# Patient Record
Sex: MALE | Race: OTHER | Employment: UNEMPLOYED | URBAN - METROPOLITAN AREA
[De-identification: names, ages, dates, MRNs, and addresses within clinical notes are randomized per-mention and may not be internally consistent; named-entity substitution may affect disease eponyms.]

---

## 2021-09-10 PROBLEM — E66.01 MORBID OBESITY DUE TO EXCESS CALORIES (HCC): Status: ACTIVE | Noted: 2021-09-10

## 2022-11-19 ENCOUNTER — HOSPITAL ENCOUNTER (EMERGENCY)
Age: 52
Discharge: HOME | End: 2022-11-19
Attending: EMERGENCY MEDICINE
Payer: MEDICARE

## 2022-11-19 VITALS
TEMPERATURE: 97.6 F | WEIGHT: 315 LBS | BODY MASS INDEX: 55.09 KG/M2 | RESPIRATION RATE: 18 BRPM | OXYGEN SATURATION: 95 % | HEART RATE: 83 BPM | SYSTOLIC BLOOD PRESSURE: 149 MMHG | DIASTOLIC BLOOD PRESSURE: 101 MMHG

## 2022-11-19 DIAGNOSIS — H69.82 DYSFUNCTION OF LEFT EUSTACHIAN TUBE: Primary | ICD-10-CM

## 2022-11-19 PROCEDURE — 99283 EMERGENCY DEPT VISIT LOW MDM: CPT

## 2022-11-19 PROCEDURE — 6360000002 HC RX W HCPCS: Performed by: EMERGENCY MEDICINE

## 2022-11-19 PROCEDURE — 6370000000 HC RX 637 (ALT 250 FOR IP): Performed by: EMERGENCY MEDICINE

## 2022-11-19 RX ORDER — FLUTICASONE PROPIONATE 50 MCG
1 SPRAY, SUSPENSION (ML) NASAL DAILY
Qty: 1 EACH | Refills: 0 | Status: SHIPPED | OUTPATIENT
Start: 2022-11-19 | End: 2022-11-26

## 2022-11-19 RX ADMIN — DEXAMETHASONE 10 MG: 6 TABLET ORAL at 04:40

## 2022-11-19 ASSESSMENT — ENCOUNTER SYMPTOMS
COUGH: 0
NAUSEA: 0
VOMITING: 0

## 2022-11-19 NOTE — ED PROVIDER NOTES
[address] [facility]  Emergency Medicine Department    Pt Name: Benji Smith  MRN: [id number]  [place] 1970  Date of evaluation: 11/19/2022  Provider: Belinda Veloz MD    [address] [place] [address]     Chief Complaint   Patient presents with    Abdominal Pain    Otalgia     Left ear  pain since today after took shower       HISTORY OF PRESENT ILLNESS  (Location/Symptom, Timing/Onset, Context/Setting,Quality, Duration, Modifying Factors, Severity.)   Benji Smith is a 46 y.o. male who presents to the emergency department complaining of discomfort in his left ear. He states that after he took a shower he noticed the ear felt full and muffled. He denies pain. Denies fevers. He tried using a Q-tip to get water out but it did not help. He denies any drainage from the ear. Denies any recent sickness. Nursing Notes were reviewed. ALLERGIES     Patient has no known allergies. CURRENT MEDICATIONS       Previous Medications    ATORVASTATIN (LIPITOR) 40 MG TABLET    Take 1 tablet by mouth nightly    BLOOD GLUCOSE TEST STRIPS (ASCENSIA AUTODISC VI;ONE TOUCH ULTRA TEST VI) STRIP    1 each by In Vitro route daily Indications: Type 2 Diabetes Dispense True Metrix blood glucose test strips for testing one time daily    COMPRESSION STOCKINGS MISC    by Does not apply route    COMPRESSION STOCKINGS MISC    by Does not apply route    FUROSEMIDE (LASIX) 20 MG TABLET    Take 1 tablet by mouth once daily    GLUCOSE MONITORING (FREESTYLE FREEDOM) KIT    1 kit by Does not apply route daily Indications: Type 2 Diabetes Dispense True Metrix glucometer for testing one time daily    LANCETS MISC.  MISC    1 each by Does not apply route daily Indications: Type 2 Diabetes Dispense True Metrix lancets for testing one time daily    METFORMIN (GLUCOPHAGE) 500 MG TABLET    Take 1 tablet by mouth daily (with breakfast)    SILDENAFIL (VIAGRA) 50 MG TABLET    Take 1 tablet by mouth as needed for Erectile Dysfunction       PAST MEDICAL HISTORY         Diagnosis Date    Back pain     Diabetes mellitus type 2 in obese [facility])     Joint pain, knee     Morbid obesity due to excess calories ([facility] [address] 09/10/2021    Muscle weakness     Other hyperlipidemia     Snoring     Snoring     SOBOE (shortness of breath on exertion)     SOBOE (shortness of breath on exertion)        SURGICAL HISTORY     History reviewed. No pertinent surgical history. FAMILY HISTORY           Problem Relation Age of Onset    Heart Disease Mother     High Blood Pressure Mother     Diabetes Mother     Breast Cancer Mother     High Blood Pressure Sister     Diabetes Sister     Heart Disease Brother     High Blood Pressure Brother     Kidney Disease Brother     Other Brother         liver issues     Family Status   Relation Name Status    Mother      Father  Alive    Sister      Sister  Alive    Sister  Alive    Sister  Alive    Brother  Alive    MGM      MGF      PGM      PGF          SOCIAL HISTORY      reports that he has never smoked. He has never used smokeless tobacco. He reports that he does not drink alcohol and does not use drugs. REVIEW OF SYSTEMS    (2-9 systems for level 4, 10 or more for level 5)     Review of Systems   Constitutional:  Negative for chills and fever. HENT:  Negative for congestion, ear discharge and ear pain. Muffled left ear   Respiratory:  Negative for cough. Gastrointestinal:  Negative for nausea and vomiting. Allergic/Immunologic: Negative for immunocompromised state. Neurological:  Negative for dizziness and headaches. PHYSICAL EXAM    (up to 7 for level 4, 8 or more for level 5)     ED Triage Vitals [22 0414]   BP Temp Temp Source Heart Rate Resp SpO2 Height Weight   (!) 149/101 97.6 °F (36.4 °C) Oral 83 18 95 % -- (!) 395 lb (179.2 kg)       Physical Exam  Vitals and nursing note reviewed.    Constitutional:       General: He is not in acute distress. Appearance: Normal appearance. He is obese. He is not ill-appearing. HENT:      Right Ear: Tympanic membrane normal.      Left Ear: Ear canal and external ear normal. No tenderness. Tympanic membrane is scarred and bulging. Nose: Nose normal.      Mouth/Throat:      Mouth: Mucous membranes are moist.   Eyes:      Extraocular Movements: Extraocular movements intact. Cardiovascular:      Pulses: Normal pulses. Pulmonary:      Effort: Pulmonary effort is normal. No respiratory distress. Musculoskeletal:         General: No deformity or signs of injury. Normal range of motion. Cervical back: Normal range of motion and neck supple. Skin:     General: Skin is warm and dry. Neurological:      General: No focal deficit present. Mental Status: He is alert and oriented to person, place, and time. Psychiatric:         Mood and Affect: Mood normal.         Behavior: Behavior normal.       DIAGNOSTIC RESULTS     RADIOLOGY:   Non-plain film images such as CT, Ultrasound and MRI are read by theradiologist. Plain radiographic images are visualized and preliminarily interpreted by the emergency physician with the below findings:    None indicated    ED BEDSIDE ULTRASOUND:   Performed by ED Physician - none    LABS:  Labs Reviewed - No data to display    All other labs were within normal range or not returned as of this dictation. EMERGENCYDEPARTMENT COURSE and DIFFERENTIAL DIAGNOSIS/MDM:   Vitals:    Vitals:    11/19/22 0414   BP: (!) 149/101   Pulse: 83   Resp: 18   Temp: 97.6 °F (36.4 °C)   TempSrc: Oral   SpO2: 95%   Weight: (!) 395 lb (179.2 kg)     51-year-old male with a sensation of fullness in his left ear. The tympanic membrane appears scarred and slightly bulging but no erythema. He has no pain either. I do not feel this is consistent with otitis media. Likely eustachian tube dysfunction.   We will treat with a dose of oral steroid and place the patient on nasal steroid spray.    CONSULTS:  None    PROCEDURES:  None indicated    FINAL IMPRESSION     1.  Dysfunction of left eustachian tube          DISPOSITION/PLAN   DISPOSITION Decision To Discharge 11/19/2022 04:30:20 AM    PATIENT REFERRED TO:   Surinder Andersen MD  [address] [place] [address] [phone]    Schedule an appointment as soon as possible for a visit   For Follow up, As needed    DISCHARGE MEDICATIONS:     New Prescriptions    FLUTICASONE (FLONASE) 50 MCG/ACT NASAL SPRAY    1 spray by Each Nostril route daily for 7 days     (Please note that portions of this note were completed with a voice recognition program.  Efforts were made to edit the dictations but occasionally words are mis-transcribed.)    Kim Martines MD  Attending Emergency Physician          Kim Martines MD  11/19/22 8549

## 2022-11-19 NOTE — ED TRIAGE NOTES
Mode of arrival (squad #, walk in, police, etc) : Walk in  Chief complaint(s): Left ear pain  Arrival Note (brief scenario, treatment PTA, etc). : came with complains of   left ear  discomfort  after  pt took shower today  4 hours ago. Feel  uncomfortable . Muffles in  left ear and unable to sleep . C= \"Have you ever felt that you should Cut down on your drinking? \"  No  A= \"Have people Annoyed you by criticizing your drinking? \"  No  G= \"Have you ever felt bad or Guilty about your drinking? \"  No  E= \"Have you ever had a drink as an Eye-opener first thing in the morning to steady your nerves or to help a hangover? \"  No    Deferred []    Reason for deferring: N/A  *If yes to two or more: probable alcohol abuse. *

## 2022-11-25 ENCOUNTER — HOSPITAL ENCOUNTER (EMERGENCY)
Age: 52
Discharge: HOME | End: 2022-11-25
Attending: EMERGENCY MEDICINE
Payer: MEDICARE

## 2022-11-25 VITALS
HEART RATE: 79 BPM | RESPIRATION RATE: 16 BRPM | SYSTOLIC BLOOD PRESSURE: 176 MMHG | DIASTOLIC BLOOD PRESSURE: 100 MMHG | TEMPERATURE: 98.1 F | OXYGEN SATURATION: 98 %

## 2022-11-25 DIAGNOSIS — H60.392 INFECTIVE OTITIS EXTERNA OF LEFT EAR: Primary | ICD-10-CM

## 2022-11-25 PROCEDURE — 6370000000 HC RX 637 (ALT 250 FOR IP): Performed by: EMERGENCY MEDICINE

## 2022-11-25 PROCEDURE — 6360000002 HC RX W HCPCS: Performed by: EMERGENCY MEDICINE

## 2022-11-25 PROCEDURE — 99284 EMERGENCY DEPT VISIT MOD MDM: CPT

## 2022-11-25 PROCEDURE — 96372 THER/PROPH/DIAG INJ SC/IM: CPT

## 2022-11-25 RX ORDER — NAPROXEN 500 MG/1
500 TABLET ORAL 2 TIMES DAILY
Qty: 20 TABLET | Refills: 0 | Status: SHIPPED | OUTPATIENT
Start: 2022-11-25

## 2022-11-25 RX ORDER — KETOROLAC TROMETHAMINE 30 MG/ML
30 INJECTION, SOLUTION INTRAMUSCULAR; INTRAVENOUS ONCE
Status: COMPLETED | OUTPATIENT
Start: 2022-11-25 | End: 2022-11-25

## 2022-11-25 RX ORDER — NEOMYCIN SULFATE, POLYMYXIN B SULFATE AND HYDROCORTISONE 10; 3.5; 1 MG/ML; MG/ML; [USP'U]/ML
3 SUSPENSION/ DROPS AURICULAR (OTIC) ONCE
Status: COMPLETED | OUTPATIENT
Start: 2022-11-25 | End: 2022-11-25

## 2022-11-25 RX ORDER — ACETAMINOPHEN 500 MG
1000 TABLET ORAL EVERY 6 HOURS PRN
Qty: 60 TABLET | Refills: 0 | Status: SHIPPED | OUTPATIENT
Start: 2022-11-25 | End: 2022-11-25 | Stop reason: SDUPTHER

## 2022-11-25 RX ORDER — ACETAMINOPHEN 500 MG
1000 TABLET ORAL EVERY 6 HOURS PRN
Qty: 60 TABLET | Refills: 0 | Status: SHIPPED | OUTPATIENT
Start: 2022-11-25

## 2022-11-25 RX ORDER — NAPROXEN 500 MG/1
500 TABLET ORAL 2 TIMES DAILY
Qty: 20 TABLET | Refills: 0 | Status: SHIPPED | OUTPATIENT
Start: 2022-11-25 | End: 2022-11-25 | Stop reason: SDUPTHER

## 2022-11-25 RX ADMIN — KETOROLAC TROMETHAMINE 30 MG: 30 INJECTION, SOLUTION INTRAMUSCULAR at 21:22

## 2022-11-25 RX ADMIN — NEOMYCIN SULFATE, POLYMYXIN B SULFATE AND HYDROCORTISONE 3 DROP: 10; 3.5; 1 SUSPENSION/ DROPS AURICULAR (OTIC) at 21:25

## 2022-11-25 ASSESSMENT — PAIN DESCRIPTION - DESCRIPTORS: DESCRIPTORS: PATIENT UNABLE TO DESCRIBE

## 2022-11-25 ASSESSMENT — PAIN - FUNCTIONAL ASSESSMENT: PAIN_FUNCTIONAL_ASSESSMENT: 0-10

## 2022-11-25 ASSESSMENT — PAIN DESCRIPTION - LOCATION: LOCATION: EAR

## 2022-11-25 ASSESSMENT — PAIN SCALES - GENERAL
PAINLEVEL_OUTOF10: 4
PAINLEVEL_OUTOF10: 5

## 2022-11-25 ASSESSMENT — PAIN DESCRIPTION - ORIENTATION: ORIENTATION: LEFT

## 2022-11-26 NOTE — ED NOTES
Pt comes to this ER with c/o ongoing lt earache after getting water in his ear while taking a shower. Pt arrives A+O x 4, GCS = 15, PMS x 4 intact, eupneic, and PWD. Pt denies fever, chills, or sweats. Pt has no visible ear drainage.      Stefanie Hale RN  11/25/22 8040

## 2022-11-26 NOTE — ED TRIAGE NOTES
Mode of arrival (squad #, walk in, police, etc) : walk in        Chief complaint(s): left ear pain        Arrival Note (brief scenario, treatment PTA, etc). : states he got fluid behind his ear 1 week ago. Seen and treated but pain came back about 3 days ago        C= \"Have you ever felt that you should Cut down on your drinking? \"  No  A= \"Have people Annoyed you by criticizing your drinking? \"  No  G= \"Have you ever felt bad or Guilty about your drinking? \"  No  E= \"Have you ever had a drink as an Eye-opener first thing in the morning to steady your nerves or to help a hangover? \"  No      Deferred []      Reason for deferring: N/A    *If yes to two or more: probable alcohol abuse. *

## 2022-11-26 NOTE — ED PROVIDER NOTES
EMERGENCY DEPARTMENT ENCOUNTER    Pt Name: Gerry Matias  MRN: [id number]  [place] 1970  Date of evaluation: 11/25/22  CHIEF COMPLAINT       Chief Complaint   Patient presents with    Otalgia     HISTORY OF PRESENT ILLNESS     Ear Problem  Location:  Left  Severity:  Moderate  Onset quality:  Gradual  Duration:  1 week  Timing:  Constant  Progression:  Worsening  Chronicity:  New  Relieved by:  Nothing  Worsened by:  Nothing  Ineffective treatments:  None tried  Associated symptoms: no tinnitus      Increasing pain ever since getting water in left ear a week ago in the shower  Was seen here a week ago, took some steroids and nasal spray that didn't work    REVIEW OF SYSTEMS     Review of Systems   HENT:  Negative for tinnitus. All other systems reviewed and are negative. PASTMEDICAL HISTORY     Past Medical History:   Diagnosis Date    Back pain     Diabetes mellitus type 2 in obese [facility])     Joint pain, knee     Morbid obesity due to excess calories ([facility] [address] 09/10/2021    Muscle weakness     Other hyperlipidemia     Snoring     Snoring     SOBOE (shortness of breath on exertion)     SOBOE (shortness of breath on exertion)      Past Problem List  Patient Active Problem List   Diagnosis Code    Morbid obesity due to excess calories ([facility] [address] E66.01    Back pain M54.9    Diabetes mellitus type 2 in obese ([facility]) E11.69, E66.9    Other hyperlipidemia E78.49     SURGICAL HISTORY     History reviewed. No pertinent surgical history.   CURRENT MEDICATIONS       Current Discharge Medication List        CONTINUE these medications which have NOT CHANGED    Details   fluticasone (FLONASE) 50 MCG/ACT nasal spray 1 spray by Each Nostril route daily for 7 days  Qty: 1 each, Refills: 0      furosemide (LASIX) 20 MG tablet Take 1 tablet by mouth once daily  Qty: 90 tablet, Refills: 0      atorvastatin (LIPITOR) 40 MG tablet Take 1 tablet by mouth nightly  Qty: 30 tablet, Refills: 3    Associated Diagnoses: Type 2 diabetes mellitus without complication, without long-term current use of insulin ([facility])      metFORMIN (GLUCOPHAGE) 500 MG tablet Take 1 tablet by mouth daily (with breakfast)  Qty: 30 tablet, Refills: 3    Associated Diagnoses: Type 2 diabetes mellitus without complication, without long-term current use of insulin ([facility])      sildenafil (VIAGRA) 50 MG tablet Take 1 tablet by mouth as needed for Erectile Dysfunction  Qty: 30 tablet, Refills: 3    Associated Diagnoses: Erectile dysfunction, unspecified erectile dysfunction type      glucose monitoring (FREESTYLE FREEDOM) kit 1 kit by Does not apply route daily Indications: Type 2 Diabetes Dispense True Metrix glucometer for testing one time daily  Qty: 1 kit, Refills: 0    Associated Diagnoses: Type 2 diabetes mellitus without complication, without long-term current use of insulin ([facility])      blood glucose test strips (ASCENSIA AUTODISC VI;ONE TOUCH ULTRA TEST VI) strip 1 each by In Vitro route daily Indications: Type 2 Diabetes Dispense True Metrix blood glucose test strips for testing one time daily  Qty: 100 strip, Refills: 3      Lancets Misc. MISC 1 each by Does not apply route daily Indications: Type 2 Diabetes Dispense True Metrix lancets for testing one time daily  Qty: 100 each, Refills: 3      !! Compression Stockings MISC by Does not apply route  Qty: 1 each, Refills: 0      !! Compression Stockings MISC by Does not apply route  Qty: 1 each, Refills: 0       !! - Potential duplicate medications found. Please discuss with provider. ALLERGIES     has No Known Allergies. FAMILY HISTORY     He indicated that his mother is . He indicated that his father is alive. He indicated that three of his four sisters are alive. He indicated that his brother is alive. He indicated that his maternal grandmother is . He indicated that his maternal grandfather is . He indicated that his paternal grandmother is .  He indicated that his paternal grandfather is . SOCIAL HISTORY       Social History     Tobacco Use    Smoking status: Never    Smokeless tobacco: Never   Vaping Use    Vaping Use: Never used   Substance Use Topics    Alcohol use: Never    Drug use: Never     Comment: caffeine occ     PHYSICAL EXAM     INITIAL VITALS: BP (!) 176/100   Pulse 79   Temp 98.1 °F (36.7 °C)   Resp 16   SpO2 98%    Physical Exam  Constitutional:       General: He is not in acute distress. Appearance: Normal appearance. He is well-developed. He is not diaphoretic. HENT:      Head: Normocephalic and atraumatic. Right Ear: External ear normal.      Left Ear: External ear normal.      Ears:      Comments: Left EAC edema and mild erythema and tenderness, mild pain with auricular traction, no mastoid tenderness, unable to visualize tm left ear due to the EAC edema     Nose: Nose normal. No congestion. Mouth/Throat:      Mouth: Mucous membranes are moist.      Pharynx: Oropharynx is clear. Eyes:      General:         Right eye: No discharge. Left eye: No discharge. Conjunctiva/sclera: Conjunctivae normal.      Pupils: Pupils are equal, round, and reactive to light. Neck:      Trachea: No tracheal deviation. Cardiovascular:      Rate and Rhythm: Normal rate and regular rhythm. Pulses: Normal pulses. Heart sounds: Normal heart sounds. Pulmonary:      Effort: Pulmonary effort is normal. No respiratory distress. Breath sounds: Normal breath sounds. No stridor. No wheezing or rales. Abdominal:      Palpations: Abdomen is soft. Tenderness: There is no abdominal tenderness. There is no guarding or rebound. Musculoskeletal:         General: No tenderness or deformity. Normal range of motion. Cervical back: Normal range of motion and neck supple. Skin:     General: Skin is warm and dry. Capillary Refill: Capillary refill takes less than 2 seconds. Findings: No erythema or rash.    Neurological: General: No focal deficit present. Mental Status: He is alert and oriented to person, place, and time. Coordination: Coordination normal.   Psychiatric:         Mood and Affect: Mood normal.         Behavior: Behavior normal.         Thought Content: Thought content normal.         Judgment: Judgment normal.       MEDICAL DECISION MAKING:   Topical cortisporin  Toradol in ED  Tylenol and naprosyn rx also  Discussed with patient anticipatory guidance, discharge instructions, follow up PCP and ENT 24 hours      #93 - Acute Otitis Externa (AOE): Systemic Antimicrobial Therapy   [x]The patient has Acute Otitis Externa and was not prescribed oral/systemic antibiotics today.  [SATISFIES MIPS PERFORMANCE]         Procedures    DIAGNOSTIC RESULTS     EMERGENCY DEPARTMENTCOURSE:         Vitals:    Vitals:    11/25/22 2028   BP: (!) 176/100   Pulse: 79   Resp: 16   Temp: 98.1 °F (36.7 °C)   SpO2: 98%       The patient was given the following medications while in the emergency department:  Orders Placed This Encounter   Medications    ketorolac (TORADOL) injection 30 mg    neomycin-polymyxin-hydrocortisone (CORTISPORIN) otic suspension 3 drop    DISCONTD: neomycin-polymyxin-hydrocortisone (CORTISPORIN) 3.5-28258-6 otic solution     Sig: Place 3 drops into the left ear 4 times daily for 10 days     Dispense:  1 each     Refill:  0    DISCONTD: acetaminophen (TYLENOL) 500 MG tablet     Sig: Take 2 tablets by mouth every 6 hours as needed for Pain     Dispense:  60 tablet     Refill:  0    DISCONTD: naproxen (NAPROSYN) 500 MG tablet     Sig: Take 1 tablet by mouth 2 times daily     Dispense:  20 tablet     Refill:  0    neomycin-polymyxin-hydrocortisone (CORTISPORIN) 3.5-76429-6 otic solution     Sig: Place 3 drops into the left ear 4 times daily for 10 days     Dispense:  1 each     Refill:  0    naproxen (NAPROSYN) 500 MG tablet     Sig: Take 1 tablet by mouth 2 times daily     Dispense:  20 tablet     Refill:  0 acetaminophen (TYLENOL) 500 MG tablet     Sig: Take 2 tablets by mouth every 6 hours as needed for Pain     Dispense:  60 tablet     Refill:  0     CONSULTS:  None    FINAL IMPRESSION      1. Infective otitis externa of left ear          DISPOSITION/PLAN   DISPOSITION Decision To Discharge 11/25/2022 09:15:18 PM      PATIENT REFERRED TO:  Fiorella Ansari MD  [address] [place] [address] [phone]    Schedule an appointment as soon as possible for a visit in 1 day      [facility], [facility]  [phone]    Schedule an appointment as soon as possible for a visit in 1 day      DISCHARGE MEDICATIONS:  Current Discharge Medication List        START taking these medications    Details   neomycin-polymyxin-hydrocortisone (CORTISPORIN) 3.5-77977-0 otic solution Place 3 drops into the left ear 4 times daily for 10 days  Qty: 1 each, Refills: 0      naproxen (NAPROSYN) 500 MG tablet Take 1 tablet by mouth 2 times daily  Qty: 20 tablet, Refills: 0      acetaminophen (TYLENOL) 500 MG tablet Take 2 tablets by mouth every 6 hours as needed for Pain  Qty: 60 tablet, Refills: 0           The care is provided during an unprecedented national emergency due to the novel coronavirus, COVID 19.   MD Ernesto Arriaga MD  11/25/22 2086

## 2023-11-17 ENCOUNTER — HOSPITAL ENCOUNTER (EMERGENCY)
Age: 53
Discharge: HOME | End: 2023-11-17
Attending: EMERGENCY MEDICINE
Payer: MEDICAID

## 2023-11-17 VITALS
RESPIRATION RATE: 18 BRPM | SYSTOLIC BLOOD PRESSURE: 136 MMHG | OXYGEN SATURATION: 96 % | HEIGHT: 73 IN | DIASTOLIC BLOOD PRESSURE: 93 MMHG | WEIGHT: 315 LBS | TEMPERATURE: 98.6 F | HEART RATE: 85 BPM | BODY MASS INDEX: 41.75 KG/M2

## 2023-11-17 DIAGNOSIS — M25.511 CHRONIC RIGHT SHOULDER PAIN: Primary | ICD-10-CM

## 2023-11-17 DIAGNOSIS — G89.29 CHRONIC RIGHT SHOULDER PAIN: Primary | ICD-10-CM

## 2023-11-17 DIAGNOSIS — K12.1 STOMATITIS: ICD-10-CM

## 2023-11-17 PROCEDURE — 99283 EMERGENCY DEPT VISIT LOW MDM: CPT

## 2023-11-17 ASSESSMENT — PAIN - FUNCTIONAL ASSESSMENT: PAIN_FUNCTIONAL_ASSESSMENT: 0-10

## 2023-11-17 ASSESSMENT — PAIN SCALES - GENERAL: PAINLEVEL_OUTOF10: 7

## 2023-11-17 ASSESSMENT — PAIN DESCRIPTION - ORIENTATION: ORIENTATION: RIGHT

## 2023-11-17 ASSESSMENT — PAIN DESCRIPTION - LOCATION: LOCATION: SHOULDER

## 2023-11-17 NOTE — ED TRIAGE NOTES
Mode of arrival (squad #, walk in, police, etc) : Walk in         Chief complaint(s): Shoulder Pain, Mouth Problem         Arrival Note (brief scenario, treatment PTA, etc). : Patient complains of white spots and irritation in the oral cavity he also complains of right sided shoulder pain, denies any known injury but says he has had problems with this shoulder in the past         C= \"Have you ever felt that you should Cut down on your drinking? \"  No  A= \"Have people Annoyed you by criticizing your drinking? \"  No  G= \"Have you ever felt bad or Guilty about your drinking? \"  No  E= \"Have you ever had a drink as an Eye-opener first thing in the morning to steady your nerves or to help a hangover? \"  No      Deferred []      Reason for deferring: N/A    *If yes to two or more: probable alcohol abuse. *
Yes